# Patient Record
Sex: FEMALE | Race: WHITE | Employment: FULL TIME | URBAN - METROPOLITAN AREA
[De-identification: names, ages, dates, MRNs, and addresses within clinical notes are randomized per-mention and may not be internally consistent; named-entity substitution may affect disease eponyms.]

---

## 2024-08-12 ENCOUNTER — HOSPITAL ENCOUNTER (OUTPATIENT)
Dept: PHYSICAL THERAPY | Age: 29
Setting detail: RECURRING SERIES
Discharge: HOME | End: 2024-08-15
Payer: COMMERCIAL

## 2024-08-12 DIAGNOSIS — R27.8 OTHER LACK OF COORDINATION: ICD-10-CM

## 2024-08-12 DIAGNOSIS — N39.41 URGE INCONTINENCE: ICD-10-CM

## 2024-08-12 DIAGNOSIS — M62.838 OTHER MUSCLE SPASM: Primary | ICD-10-CM

## 2024-08-12 DIAGNOSIS — N39.3 STRESS INCONTINENCE (FEMALE) (MALE): ICD-10-CM

## 2024-08-12 PROCEDURE — 97163 PT EVAL HIGH COMPLEX 45 MIN: CPT

## 2024-08-12 PROCEDURE — 97530 THERAPEUTIC ACTIVITIES: CPT

## 2024-08-12 ASSESSMENT — PAIN SCALES - GENERAL: PAINLEVEL_OUTOF10: 6

## 2024-08-12 NOTE — PROGRESS NOTES
Ursula Gadnara  : 1995  Primary: [employer]na (Commercial)  Secondary: HUMANA MEDICAID [facility] @ [address]  [place] [address]  Phone: [phone]  Fax: [phone] Plan Frequency: 1x/week  Plan of Care/Certification Expiration Date: 24        Plan of Care/Certification Expiration Date:  Plan of Care/Certification Expiration Date: 24    Frequency/Duration: Plan Frequency: 1x/week      Time In/Out:   Time In: 0105  Time Out: 0208      PT Visit Info:    Progress Note Due Date: 24      Visit Count:  1    OUTPATIENT PHYSICAL THERAPY:   Treatment Note 2024       Episode  (PFPT)               Treatment Diagnosis:    Other muscle spasm  Other lack of coordination  Urge incontinence  Stress incontinence (female) (male)  Medical/Referring Diagnosis:    Pelvic and perineal pain    Referring Physician:  Tanvi Bustamante APRN - NP MD Orders:  PT Eval and Treat   Return MD Appt:     Date of Onset:  No data recorded   Allergies:   Patient has no allergy information on record.  Restrictions/Precautions:   Trauma induced seizures      Interventions Planned (Treatment may consist of any combination of the following):     See Assessment Note    Subjective Comments:   See IE dated 24  Initial Pain Level::     6/10  Post Session Pain Level:       7/10  Medications Last Reviewed:  2024  Updated Objective Findings:  See Evaluation Note from today  Treatment   THERAPEUTIC ACTIVITY: ( 25 minutes): Functional activity education regarding anatomy, pathology and role of pelvic floor muscle (PFM) function in relation to presenting symptoms and role of pelvic floor therapy in conservative treatment. and Instruction on coordinated pelvic floor and diaphragmatic breathing to improve kinesthetic awareness of pelvic muscle mobility and restore proper motor recruitment patterns with breathing, posture, and functional movement (e.g. appropriate

## 2024-08-12 NOTE — THERAPY EVALUATION
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)  Secondary: HUMANA MEDICAID [facility] @ [address]  [facility] [address]  Phone: [phone]  Fax: [phone] Plan Frequency: 1x/week    Plan of Care/Certification Expiration Date: 24        Plan of Care/Certification Expiration Date:  Plan of Care/Certification Expiration Date: 24    Frequency/Duration: Plan Frequency: 1x/week      Time In/Out:   Time In: 0105  Time Out: 0208    PT Visit Info:    Plan Frequency: 1x/week  Progress Note Due Date: 24      Visit Count:  1                OUTPATIENT PHYSICAL THERAPY:             Initial Assessment 2024               Episode (PFPT)         Treatment Diagnosis:     Other muscle spasm  Other lack of coordination  Urge incontinence  Stress incontinence (female) (male)  Contributing Diagnosis:  Pelvic floor dysfunction in female (M62.98)  Medical/Referring Diagnosis:    Pelvic and perineal pain      Referring Physician:  Tanvi Bustamante APRN - NP  MD Orders:  PT Eval and Treat   Return MD Appt:  2024  Date of Onset:    Chronic  Allergies:  Patient has no allergy information on record.  Restrictions/Precautions:    History of non-epileptic seizures       Medications Last Reviewed:  2024     SUBJECTIVE   History of Injury/Illness (Reason for Referral):  Ms. Gandara is a 27 yo female referred to pelvic floor physical therapy (PFPT) by Tanvi Bustamante, AP* 2/2 Pelvic and perineal pain [R10.2] and urinary incontinence.     Urinary: Pt describes urinary issues since she was a child. She describes urinary incontinence since she was a child. Urinary leakage occurs with a strong urge to urination. She has had urodynamic testing previously (2018 or 2019) without evident findings. She was offered Botox, but she declined. States she lived in [place] when this was completed.     Pelvic pain:   She describes pain when her bladder 
,DirectAddress_Unknown

## 2024-08-27 ENCOUNTER — HOSPITAL ENCOUNTER (OUTPATIENT)
Dept: PHYSICAL THERAPY | Age: 29
Setting detail: RECURRING SERIES
Discharge: HOME | End: 2024-08-30
Payer: COMMERCIAL

## 2024-08-27 PROCEDURE — 97110 THERAPEUTIC EXERCISES: CPT

## 2024-08-27 PROCEDURE — 97140 MANUAL THERAPY 1/> REGIONS: CPT

## 2024-08-27 ASSESSMENT — PAIN SCALES - GENERAL: PAINLEVEL_OUTOF10: 4

## 2024-09-03 ENCOUNTER — APPOINTMENT (OUTPATIENT)
Dept: PHYSICAL THERAPY | Age: 29
End: 2024-09-03
Payer: COMMERCIAL

## 2024-09-05 ENCOUNTER — HOSPITAL ENCOUNTER (OUTPATIENT)
Dept: PHYSICAL THERAPY | Age: 29
Setting detail: RECURRING SERIES
Discharge: HOME | End: 2024-09-08
Payer: COMMERCIAL

## 2024-09-05 PROCEDURE — 97140 MANUAL THERAPY 1/> REGIONS: CPT

## 2024-09-05 PROCEDURE — 97110 THERAPEUTIC EXERCISES: CPT

## 2024-09-05 ASSESSMENT — PAIN SCALES - GENERAL: PAINLEVEL_OUTOF10: 3

## 2024-09-10 ENCOUNTER — HOSPITAL ENCOUNTER (OUTPATIENT)
Dept: PHYSICAL THERAPY | Age: 29
Setting detail: RECURRING SERIES
Discharge: HOME | End: 2024-09-13
Payer: COMMERCIAL

## 2024-09-10 PROCEDURE — 97110 THERAPEUTIC EXERCISES: CPT

## 2024-09-10 PROCEDURE — 97140 MANUAL THERAPY 1/> REGIONS: CPT

## 2024-09-10 ASSESSMENT — PAIN SCALES - GENERAL: PAINLEVEL_OUTOF10: 2

## 2024-09-17 ENCOUNTER — APPOINTMENT (OUTPATIENT)
Dept: PHYSICAL THERAPY | Age: 29
End: 2024-09-17
Payer: COMMERCIAL

## 2024-09-24 ENCOUNTER — HOSPITAL ENCOUNTER (OUTPATIENT)
Dept: PHYSICAL THERAPY | Age: 29
Setting detail: RECURRING SERIES
Discharge: HOME | End: 2024-09-27
Payer: COMMERCIAL

## 2024-09-24 PROCEDURE — 97140 MANUAL THERAPY 1/> REGIONS: CPT

## 2024-09-24 PROCEDURE — 97110 THERAPEUTIC EXERCISES: CPT

## 2024-09-24 ASSESSMENT — PAIN SCALES - GENERAL: PAINLEVEL_OUTOF10: 5

## 2024-10-01 ENCOUNTER — APPOINTMENT (OUTPATIENT)
Dept: PHYSICAL THERAPY | Age: 29
End: 2024-10-01
Payer: COMMERCIAL

## 2024-10-08 ENCOUNTER — HOSPITAL ENCOUNTER (OUTPATIENT)
Dept: PHYSICAL THERAPY | Age: 29
Setting detail: RECURRING SERIES
Discharge: HOME | End: 2024-10-11
Payer: COMMERCIAL

## 2024-10-08 PROCEDURE — 97140 MANUAL THERAPY 1/> REGIONS: CPT

## 2024-10-08 PROCEDURE — 97110 THERAPEUTIC EXERCISES: CPT

## 2024-10-08 ASSESSMENT — PAIN SCALES - GENERAL: PAINLEVEL_OUTOF10: 3

## 2024-10-08 NOTE — PROGRESS NOTES
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)  Secondary: HUMANA MEDICAID [facility] @ [address]  [facility] [address]  Phone: [phone]  Fax: [phone] Plan Frequency: 1x/week  Plan of Care/Certification Expiration Date: 24        Plan of Care/Certification Expiration Date:  Plan of Care/Certification Expiration Date: 24    Frequency/Duration: Plan Frequency: 1x/week      Time In/Out:   Time In: 0105  Time Out: 0203      PT Visit Info:    Progress Note Due Date: 24      Visit Count:  6    OUTPATIENT PHYSICAL THERAPY:   Treatment Note 10/8/2024       Episode  (PFPT)               Treatment Diagnosis:     Other muscle spasm  Other lack of coordination  Urge incontinence  Stress incontinence (female) (male)  Contributing Diagnosis:  Pelvic floor dysfunction in female (M62.98)  Medical/Referring Diagnosis:    Pelvic and perineal pain      Referring Physician:  Tanvi Bustamante APRN - NP MD Orders:  PT Eval and Treat   Return MD Appt:     Date of Onset:  No data recorded   Allergies:   Patient has no allergy information on record.  Restrictions/Precautions:   Trauma induced seizures      Interventions Planned (Treatment may consist of any combination of the following):     See Assessment Note    Subjective Comments:   10/8/24:  Current pelvic pain: 3/10. Pt ambulating with cane.   Pt is leaking less.   1 PPD.   Pt not experiencing pelvic floor spasms.   She is working on drinking water.   Pt experienced pain following her previous session. She notices pain following the exercises and then her pain lessens.     24:  Pt ambulating with RW. States her vertigo and balance have not been good. She is also experiencing ankle pain.   Current pelvic pain: 5/10  Continues to use 1 PPD.   Pt describes cramping and intermittent sharp pain following previous therapy session. She felt relief after this subsided.   Frequency of PFM spasms

## 2024-10-15 ENCOUNTER — HOSPITAL ENCOUNTER (OUTPATIENT)
Dept: PHYSICAL THERAPY | Age: 29
Setting detail: RECURRING SERIES
Discharge: HOME | End: 2024-10-18
Payer: COMMERCIAL

## 2024-10-15 ENCOUNTER — APPOINTMENT (OUTPATIENT)
Dept: PHYSICAL THERAPY | Age: 29
End: 2024-10-15
Payer: COMMERCIAL

## 2024-10-15 PROCEDURE — 97110 THERAPEUTIC EXERCISES: CPT

## 2024-10-15 PROCEDURE — 97140 MANUAL THERAPY 1/> REGIONS: CPT

## 2024-10-15 ASSESSMENT — PAIN SCALES - GENERAL: PAINLEVEL_OUTOF10: 3

## 2024-10-15 NOTE — PROGRESS NOTES
8, seated   Supine PFM stretch In butterfly position x 5 breaths     HEP review/update Reviewed - use of kegal and exhalation with transitional movements including bed mobility     Sit to stand with PFM activation 3 x 5, coordination of exhale and PFM contraction   3 x 5, coordination of exhale and PFM contraction    Bridge 3 x 5 with hip abduction, YTB 3 x 5 with hip abduction, BTB 2 x 8 reps   Side-lying hip ER 2 x 10, YTB 2 x 10, YTB 2 x 10, YTB   Side-stepping  4 x 6 steps (YTB) above knees    Plyoff press  2 x 10 BTB             Access Code: [email or web address]  URL: [email or web address]  Date: 09/05/2024  Prepared by: Johanne Spencer    Exercises  - Hooklying Clamshell with Resistance  - 1 x daily - 7 x weekly - 2 sets - 10 reps  - Pelvic floor contraction + hip adduction  - 1 x daily - 7 x weekly - 2 sets - 10 reps  - Supported Butterfly Stretch with Pelvic Floor Relaxation  - 2 x daily - 7 x weekly - 10 reps  - Supine Diaphragmatic breathing with Pelvic Floor Contraction COORDINATION  - 2 x daily - 7 x weekly - 10 sets    THERAPEUTIC ACTIVITY: ( minutes)    TA Educational Topic Notes Date Completed   Pathology/Anatomy/PFM Function Completed  Reviewed 8/12/24 9/5/24   Bladder health education Reviewed 8/12/24   Urinary urge suppression     The knack     Voiding strategies     Nocturia tips     Bowel/Bladder log     Bowel health education     Constipation care     Diarrhea/Fecal leakage     Colonic massage     Toilet positioning     Defecation dynamics     Sources of fiber     Return to intercourse/vaginal trainers/wand     Perineal massage     Sexual positioning     Lubricants/vaginal moisturizers     Vulvovaginal health/vaginal irritants     Body mechanics     Posture/ergonomics     Diaphragmatic breathing Provided as HEP, reviewed rationale 8/12/24   Pain science education Initiated discussion 8/12/24   Resources and technology     Other patient education        MANUAL THERAPY: (25 minutes):   Soft tissue

## 2024-10-22 ENCOUNTER — APPOINTMENT (OUTPATIENT)
Dept: PHYSICAL THERAPY | Age: 29
End: 2024-10-22
Payer: COMMERCIAL

## 2024-10-29 ENCOUNTER — HOSPITAL ENCOUNTER (OUTPATIENT)
Dept: PHYSICAL THERAPY | Age: 29
Setting detail: RECURRING SERIES
Discharge: HOME | End: 2024-11-01
Payer: COMMERCIAL

## 2024-10-29 PROCEDURE — 97110 THERAPEUTIC EXERCISES: CPT

## 2024-10-29 PROCEDURE — 97140 MANUAL THERAPY 1/> REGIONS: CPT

## 2024-10-29 ASSESSMENT — PAIN SCALES - GENERAL: PAINLEVEL_OUTOF10: 4

## 2024-10-29 NOTE — PROGRESS NOTES
HEP review/update      Sit to stand with PFM activation 3 x 5, coordination of exhale and PFM contraction  4 x 5, coordination of exhale and PFM contraction   Bridge 3 x 5 with hip abduction, BTB 2 x 8 reps 2 x 10   Side-lying hip ER 2 x 10, YTB 2 x 10, YTB 2 x 10, BTB   Side-stepping 4 x 6 steps (YTB) above knees     Plyoff press 2 x 10 BTB              Access Code: [email or web address]  URL: [email or web address]  Date: 09/05/2024  Prepared by: Johanne Spencer    Exercises  - Hooklying Clamshell with Resistance  - 1 x daily - 7 x weekly - 2 sets - 10 reps  - Pelvic floor contraction + hip adduction  - 1 x daily - 7 x weekly - 2 sets - 10 reps  - Supported Butterfly Stretch with Pelvic Floor Relaxation  - 2 x daily - 7 x weekly - 10 reps  - Supine Diaphragmatic breathing with Pelvic Floor Contraction COORDINATION  - 2 x daily - 7 x weekly - 10 sets    THERAPEUTIC ACTIVITY: (5 minutes)    TA Educational Topic Notes Date Completed   Pathology/Anatomy/PFM Function Completed  Reviewed 8/12/24 9/5/24   Bladder health education Reviewed  Avoidance of bladder irritants including chocolate  Management of bladder spasms 8/12/24  10/29/24     Urinary urge suppression     The knack     Voiding strategies     Nocturia tips     Bowel/Bladder log     Bowel health education     Constipation care     Diarrhea/Fecal leakage     Colonic massage     Toilet positioning     Defecation dynamics     Sources of fiber     Return to intercourse/vaginal trainers/wand     Perineal massage     Sexual positioning     Lubricants/vaginal moisturizers     Vulvovaginal health/vaginal irritants     Body mechanics     Posture/ergonomics     Diaphragmatic breathing Provided as HEP, reviewed rationale 8/12/24   Pain science education Initiated discussion 8/12/24   Resources and technology     Other patient education        MANUAL THERAPY: (35 minutes):   Soft tissue mobilization was utilized and necessary because of the patient's painful spasm and

## 2024-11-05 ENCOUNTER — HOSPITAL ENCOUNTER (OUTPATIENT)
Dept: PHYSICAL THERAPY | Age: 29
Setting detail: RECURRING SERIES
Discharge: HOME | End: 2024-11-08
Payer: COMMERCIAL

## 2024-11-05 PROCEDURE — 97140 MANUAL THERAPY 1/> REGIONS: CPT | Performed by: PHYSICAL THERAPIST

## 2024-11-05 PROCEDURE — 97110 THERAPEUTIC EXERCISES: CPT | Performed by: PHYSICAL THERAPIST

## 2024-11-05 ASSESSMENT — PAIN SCALES - GENERAL: PAINLEVEL_OUTOF10: 4

## 2024-11-05 NOTE — THERAPY RECERTIFICATION
History: Number of pregnancies: 0 Number of vaginal births: 0 Number of caesarean births : 0.    *Global joint pain       Patient Stated Goal(s):  \"Build strength to help with incontinence and less pain with sex\"    Initial Pain Level:     4/10   Post Session Pain Level:     4/10    Past Medical History/Comorbidities:   Ms. Gandara  has no past medical history on file.  Ms. Gandara  has no past surgical history on file.  *Seizures - non-epileptic. States she had a seizure 2 weeks ago. She does not respond well to stress.   *She can experience screaming seizures - trauma based   *TBI - 2018    Social History/Living Environment:   Patient lives with their spouse      Level of Function/Work/Activity:    Pt on social security and completing disability application        Learning:   Does the patient/guardian have any barriers to learning?: Financial; Emotional      Fall Risk Scale:   Lou Total Score: 55         OBJECTIVE   External Observations:  Voluntary contraction: Present  Voluntary relaxation: Present  Involuntary contraction: Present  Involuntary relaxation: Present  Perineal descent at rest: Absent   Perineal descent with bearing: Present  Postural assessment: Forward Head Posture, Increased Thoracic Kyphosis, and Rounded Shoulders  Gait: Impaired - using straight cane     Pelvic Floor Muscle (PFM) Assessment:  Vaginal vault size: [] decreased     [] increased     [x] WNL  Muscle volume: [] decreased     [] WNL     [x] Defect  PFM tension: [] decreased     [x] increased     [] WNL     Contraction ability:  Voluntary contraction: [] absent     [] weak     [x] moderate      [] strong  Voluntary relaxation: [] absent     [] complete   [] incomplete   [x] delayed   MMT: 4/5      Palpation: via vaginal canal  Superficial Pelvic Floor Musculature (PFM): Tender? Intermediate PFM Tender? Deep PFM Tender?   Superficial Transverse Perineal [x] Right  [x] Left  []DNT  Mild, B   Deep Transverse Perineal [] Right  [] Left

## 2024-11-05 NOTE — PROGRESS NOTES
4 x 5, coordination of exhale and PFM contraction X 10 coordination of exhale and PFM contraction   Bridge 3 x 5 with hip abduction, BTB 2 x 8 reps 2 x 10 2 x 10   Side-lying hip ER 2 x 10, YTB 2 x 10, YTB 2 x 10, BTB    Side-stepping 4 x 6 steps (YTB) above knees      Plyoff press 2 x 10 BTB       TrA activation with march   2 x 5 reps each side      Access Code: [email or web address]  URL: [email or web address]  Date: 09/05/2024  Prepared by: Johanne Spencer    Exercises  - Hooklying Clamshell with Resistance  - 1 x daily - 7 x weekly - 2 sets - 10 reps  - Pelvic floor contraction + hip adduction  - 1 x daily - 7 x weekly - 2 sets - 10 reps  - Supported Butterfly Stretch with Pelvic Floor Relaxation  - 2 x daily - 7 x weekly - 10 reps  - Supine Diaphragmatic breathing with Pelvic Floor Contraction COORDINATION  - 2 x daily - 7 x weekly - 10 sets    THERAPEUTIC ACTIVITY: ( minutes)    TA Educational Topic Notes Date Completed   Pathology/Anatomy/PFM Function Completed  Reviewed 8/12/24 9/5/24   Bladder health education Reviewed  Avoidance of bladder irritants including chocolate  Management of bladder spasms 8/12/24  10/29/24     Urinary urge suppression     The knack     Voiding strategies     Nocturia tips     Bowel/Bladder log     Bowel health education     Constipation care     Diarrhea/Fecal leakage     Colonic massage     Toilet positioning     Defecation dynamics     Sources of fiber     Return to intercourse/vaginal trainers/wand     Perineal massage     Sexual positioning     Lubricants/vaginal moisturizers     Vulvovaginal health/vaginal irritants     Body mechanics     Posture/ergonomics     Diaphragmatic breathing Provided as HEP, reviewed rationale 8/12/24   Pain science education Initiated discussion 8/12/24   Resources and technology     Other patient education        MANUAL THERAPY: (32 minutes):   Soft tissue mobilization was utilized and necessary because of the patient's painful spasm and restricted

## 2024-11-12 ENCOUNTER — HOSPITAL ENCOUNTER (OUTPATIENT)
Dept: PHYSICAL THERAPY | Age: 29
Setting detail: RECURRING SERIES
Discharge: HOME | End: 2024-11-15
Payer: COMMERCIAL

## 2024-11-12 PROCEDURE — 97140 MANUAL THERAPY 1/> REGIONS: CPT | Performed by: PHYSICAL THERAPIST

## 2024-11-12 PROCEDURE — 97530 THERAPEUTIC ACTIVITIES: CPT | Performed by: PHYSICAL THERAPIST

## 2024-11-12 PROCEDURE — 97110 THERAPEUTIC EXERCISES: CPT | Performed by: PHYSICAL THERAPIST

## 2024-11-12 NOTE — PROGRESS NOTES
promote proper body mechanics and promote proper body breathing techniques.  Progressed range and repetitions as indicated.   Date:  10/15/24 Date:  10/29/24 Date:  11/5/24 Date:  11/12/24   Activity/Exercise Parameters Parameters Parameters Parameters   Quadruped pelvic tilts       Supine DB with LE\"s supported on chair       PFM drops - contract/relax 4 x 5 with digital cues to facilitate full PFM relaxation   4 x 5 with digital cues to facilitate full PFM relaxation  2 x 10  digital cues to facilitate full PFM relaxation    Supine hip ER/abduction  X 10 BTB 2 x 10 YTB    Supine hip adduction/ball squeeze + TrA activation 3 x 8, seated  2 x 10 supine    Supine PFM stretch       HEP review/update       Sit to stand with PFM activation  4 x 5, coordination of exhale and PFM contraction X 10 coordination of exhale and PFM contraction 2 X 10  coordination of exhale and PFM contraction   Bridge 2 x 8 reps 2 x 10 2 x 10    Side-lying hip ER 2 x 10, YTB 2 x 10, BTB     Side-stepping    3 x 5   Plyoff press       TrA activation with march  2 x 5 reps each side  Completed in standing  2 x 5   Lateral step-up    Bosu 3 x 5     Access Code: [email or web address]  URL: [email or web address]  Date: 09/05/2024  Prepared by: Johanne Spencer    Exercises  - Hooklying Clamshell with Resistance  - 1 x daily - 7 x weekly - 2 sets - 10 reps  - Pelvic floor contraction + hip adduction  - 1 x daily - 7 x weekly - 2 sets - 10 reps  - Supported Butterfly Stretch with Pelvic Floor Relaxation  - 2 x daily - 7 x weekly - 10 reps  - Supine Diaphragmatic breathing with Pelvic Floor Contraction COORDINATION  - 2 x daily - 7 x weekly - 10 sets    THERAPEUTIC ACTIVITY: (10 minutes)    TA Educational Topic Notes Date Completed   Pathology/Anatomy/PFM Function Completed  Reviewed 8/12/24 9/5/24   Bladder health education Reviewed  Avoidance of bladder irritants including chocolate  Management of bladder spasms  Reviewed - avoidance of irritants,

## 2024-11-19 ENCOUNTER — APPOINTMENT (OUTPATIENT)
Dept: PHYSICAL THERAPY | Age: 29
End: 2024-11-19
Payer: COMMERCIAL

## 2024-11-26 ENCOUNTER — APPOINTMENT (OUTPATIENT)
Dept: PHYSICAL THERAPY | Age: 29
End: 2024-11-26
Payer: COMMERCIAL

## 2024-12-03 ENCOUNTER — HOSPITAL ENCOUNTER (OUTPATIENT)
Dept: PHYSICAL THERAPY | Age: 29
Setting detail: RECURRING SERIES
Discharge: HOME | End: 2024-12-06
Payer: COMMERCIAL

## 2024-12-03 PROCEDURE — 97110 THERAPEUTIC EXERCISES: CPT | Performed by: PHYSICAL THERAPIST

## 2024-12-03 PROCEDURE — 97140 MANUAL THERAPY 1/> REGIONS: CPT | Performed by: PHYSICAL THERAPIST

## 2024-12-03 ASSESSMENT — PAIN SCALES - GENERAL: PAINLEVEL_OUTOF10: 1
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
## 2024-12-10 ENCOUNTER — HOSPITAL ENCOUNTER (OUTPATIENT)
Dept: PHYSICAL THERAPY | Age: 29
Setting detail: RECURRING SERIES
Discharge: HOME | End: 2024-12-13
Payer: COMMERCIAL

## 2024-12-10 PROCEDURE — 97140 MANUAL THERAPY 1/> REGIONS: CPT | Performed by: PHYSICAL THERAPIST

## 2024-12-10 PROCEDURE — 97110 THERAPEUTIC EXERCISES: CPT | Performed by: PHYSICAL THERAPIST

## 2024-12-10 ASSESSMENT — PAIN SCALES - GENERAL: PAINLEVEL_OUTOF10: 1

## 2024-12-10 NOTE — PROGRESS NOTES
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)  Secondary: HUMANA MEDICAID [facility] @ [address]  [facility] [address]  Phone: [phone]  Fax: [phone] Plan Frequency: 1x/week  Plan of Care/Certification Expiration Date: 24        Plan of Care/Certification Expiration Date:  Plan of Care/Certification Expiration Date: 24    Frequency/Duration: Plan Frequency: 1x/week      Time In/Out:   Time In: 0104  Time Out: 0200      PT Visit Info:    Progress Note Due Date: 24      Visit Count:  12    OUTPATIENT PHYSICAL THERAPY:   Treatment Note 12/10/2024       Episode  (PFPT)               Treatment Diagnosis:     Other muscle spasm  Other lack of coordination  Urge incontinence  Stress incontinence (female) (male)  Contributing Diagnosis:  Pelvic floor dysfunction in female (M62.98)  Medical/Referring Diagnosis:    Pelvic and perineal pain      Referring Physician:  Tanvi Bustamante APRN - NP MD Orders:  PT Eval and Treat   Return MD Appt:     Date of Onset:  No data recorded   Allergies:   Patient has no allergy information on record.  Restrictions/Precautions:   Trauma induced seizures      Interventions Planned (Treatment may consist of any combination of the following):     See Assessment Note    Subjective Comments:   12/10/24:  Pt just completed her menses.   States overall pelvic pain has been manageable.   Bladder pain also less.   Urinary incontinence: Improved. Only using pads when in public and gone for an extended period of time.  Pt started taking a Nebulizer treatment.     12/3/24:  Pt reports feeling very sore following her previus session. It took 1 week to recover.   Spasms were constant in the last week.   Currently, she is experiencing aching discomfort.   Pt saw a new Rheumatologist.  Pt feels relief with internal work.     24:  Pt attempted sexual intercourse. \"I had no feeling during intercourse. Sometimes

## 2024-12-17 ENCOUNTER — HOSPITAL ENCOUNTER (OUTPATIENT)
Dept: PHYSICAL THERAPY | Age: 29
Setting detail: RECURRING SERIES
Discharge: HOME | End: 2024-12-20
Payer: COMMERCIAL

## 2024-12-17 PROCEDURE — 97140 MANUAL THERAPY 1/> REGIONS: CPT | Performed by: PHYSICAL THERAPIST

## 2024-12-17 NOTE — PROGRESS NOTES
needling)    Pt gives verbal consent to internal vaginal assessment/treatment without chaperon present.     Treatment/Session Summary:    Treatment Assessment:   Pt continues to show improved PFM control and reduced tenderness to palpation of her PFM.  Communication/Consultation:   Equipment provided today:  HEP  Recommendations/Intent for next treatment session: Next visit will focus on PFM coordination for muscle relaxation as well as hip and core strengthening, continue intra-vaginal MT    >Total Treatment Billable Duration:  Treatment minutes 27  Time In: 0122  Time Out: 0149    Johanne Spencer PT         Charge Capture  Events  ServiceRelated Portal  Appt Desk  Attendance Report     Future Appointments   Date Time Provider Department Center   1/7/2025  1:00 PM Johanne Spencer PT [facility]   1/14/2025  2:00 PM Johanne Spencer PT [facility]   1/21/2025  1:00 PM Johanne Spencer PT [facility]

## 2024-12-23 ENCOUNTER — APPOINTMENT (OUTPATIENT)
Dept: PHYSICAL THERAPY | Age: 29
End: 2024-12-23
Payer: COMMERCIAL

## 2025-01-07 ENCOUNTER — HOSPITAL ENCOUNTER (OUTPATIENT)
Dept: PHYSICAL THERAPY | Age: 30
Setting detail: RECURRING SERIES
End: 2025-01-07
Payer: COMMERCIAL

## 2025-01-14 ENCOUNTER — HOSPITAL ENCOUNTER (OUTPATIENT)
Dept: PHYSICAL THERAPY | Age: 30
Setting detail: RECURRING SERIES
Discharge: HOME | End: 2025-01-17
Payer: COMMERCIAL

## 2025-01-14 PROCEDURE — 97140 MANUAL THERAPY 1/> REGIONS: CPT | Performed by: PHYSICAL THERAPIST

## 2025-01-14 PROCEDURE — 97110 THERAPEUTIC EXERCISES: CPT | Performed by: PHYSICAL THERAPIST

## 2025-01-14 ASSESSMENT — PAIN SCALES - GENERAL: PAINLEVEL_OUTOF10: 6

## 2025-01-14 NOTE — THERAPY RECERTIFICATION
Contraction ability:  Voluntary contraction: [] absent     [] weak     [x] moderate      [] strong  Voluntary relaxation: [] absent     [x] complete   [] incomplete   [] delayed   MMT: 4/5 x 10 contractions  Endurance: >5 seconds     Palpation: via vaginal canal  Superficial Pelvic Floor Musculature (PFM): Tender? Intermediate PFM Tender? Deep PFM Tender?   Superficial Transverse Perineal [x] Right  [x] Left  []DNT  Mild, B   Deep Transverse Perineal [] Right  [] Left  []DNT   Puborectalis [] Right  [] Left  []DNT   Ischiocavernosus [] Right  [] Left  []DNT   Compressor Urethra [] Right  [] Left  []DNT Pubococcygeus [] Right  [] Left  []DNT   Bulbocavernosus [] Right  [] Left  []DNT       Iliococcygeus [x] Right  [x] Left  []DNT  Aching, B  Moderate           Obturator Internus [x] Right Moderate [x] Aching, B[]DNT           Coccygeus [] Right  [] Left  [x]DNT          External palpation:  Muscle/muscle group Tender?   Adductors [x] Right  [x] Left  []DNT   Abdominal wall [] Right  [] Left  []DNT   Pubic symphysis [] Right  [] Left  []DNT   Iliopsoas [] Right  [] Left  []DNT   Gluteals [] Right  [] Left  []DNT   Piriformis [] Right  [] Left  []DNT      Breath assessment:  Observation: DB well coordinated  Coordination of pelvic floor muscles with breath:  moderate pelvic floor muscle excursion  Co-contraction of PFM with transversus abdominis: present  Voluntary lengthening present.     ASSESSMENT   REASSESSMENT: Ms. Gandara has been seen in skilled PT from 8/12/24 to 1/14/25. Patient has attended 14 out of 16 scheduled visits. Treatment has emphasized PFM coordination, PFM mobility, bladder re-training, core and hip strengthening, as well as pain management strategies. Patient has made improvements in reduction in pelvic pain, improved urinary control, reduced pain with gynecological exam, reduced pain with tampon use, however continues to demonstrate deficits in PFM tension and tenderness, core weakness, and hip

## 2025-01-14 NOTE — PROGRESS NOTES
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)  Secondary: [employer] MEDICAID [facility] @ [address]  [facility] [address]  Phone: [phone]  Fax: [phone] Plan Frequency: 1x/week  Plan of Care/Certification Expiration Date: 25        Plan of Care/Certification Expiration Date:  Plan of Care/Certification Expiration Date: 25    Frequency/Duration: Plan Frequency: 1x/week      Time In/Out:   Time In: 0210  Time Out: 0307      PT Visit Info:    Progress Note Due Date: 24      Visit Count:  14    OUTPATIENT PHYSICAL THERAPY:   Treatment Note 2025       Episode  (PFPT)               Treatment Diagnosis:     Other muscle spasm  Other lack of coordination  Urge incontinence  Stress incontinence (female) (male)  Contributing Diagnosis:  Pelvic floor dysfunction in female (M62.98)  Medical/Referring Diagnosis:    Pelvic and perineal pain      Referring Physician:  Tanvi Bustamante APRN - NP MD Orders:  PT Eval and Treat   Return MD Appt:     Date of Onset:  No data recorded   Allergies:   Patient has no allergy information on record.  Restrictions/Precautions:   Trauma induced seizures      Interventions Planned (Treatment may consist of any combination of the following):     See Assessment Note    Subjective Comments:   25:  Patient has been struggling with kidney stones the last 2 weeks. She was in a lot of pain and unable to differentiate between PF and kidney stones as the cause.   States she was able to pass and catch stone last night. She will f/u with her primary care doctor tomorrow. She has been in the ER 2x.   Medications have also caused more UI.   *See Re-Certification dated 1/14/25    12/10/24:  Pt just completed her menses.   States overall pelvic pain has been manageable.   Bladder pain also less.   Urinary incontinence: Improved. Only using pads when in public and gone for an extended period of time.  Pt started

## 2025-01-21 ENCOUNTER — HOSPITAL ENCOUNTER (OUTPATIENT)
Dept: PHYSICAL THERAPY | Age: 30
Setting detail: RECURRING SERIES
Discharge: HOME | End: 2025-01-24
Payer: COMMERCIAL

## 2025-01-21 PROCEDURE — 97110 THERAPEUTIC EXERCISES: CPT | Performed by: PHYSICAL THERAPIST

## 2025-01-21 PROCEDURE — 97140 MANUAL THERAPY 1/> REGIONS: CPT | Performed by: PHYSICAL THERAPIST

## 2025-01-21 NOTE — PROGRESS NOTES
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)  Secondary: [employer] MEDICAID [facility] @ [address]  [facility] [address]  Phone: [phone]  Fax: [phone] Plan Frequency: 1x/week  Plan of Care/Certification Expiration Date: 25        Plan of Care/Certification Expiration Date:  Plan of Care/Certification Expiration Date: 25    Frequency/Duration: Plan Frequency: 1x/week      Time In/Out:   Time In: 0106  Time Out: 0203      PT Visit Info:        Visit Count:  15    OUTPATIENT PHYSICAL THERAPY:   Treatment Note 2025       Episode  (PFPT)               Treatment Diagnosis:     Other muscle spasm  Other lack of coordination  Urge incontinence  Stress incontinence (female) (male)  Contributing Diagnosis:  Pelvic floor dysfunction in female (M62.98)  Medical/Referring Diagnosis:    Pelvic and perineal pain      Referring Physician:  Tanvi Bustamante APRN - NP MD Orders:  PT Eval and Treat   Return MD Appt:     Date of Onset:  No data recorded   Allergies:   Patient has no allergy information on record.  Restrictions/Precautions:   Trauma induced seizures      Interventions Planned (Treatment may consist of any combination of the following):     See Assessment Note    Subjective Comments:   25:  Pt with improved urinary control since passing kidney stones.  Notes her bladder remains very sensitive.   Pt working on consistency with completion of her HEP.  Purchased vaginal dilator today.  Ambulating without cane.    25:  Patient has been struggling with kidney stones the last 2 weeks. She was in a lot of pain and unable to differentiate between PF and kidney stones as the cause.   States she was able to pass and catch stone last night. She will f/u with her primary care doctor tomorrow. She has been in the ER 2x.   Medications have also caused more UI.   *See Re-Certification dated 1/14/25    12/10/24:  Pt just completed her

## 2025-01-29 ENCOUNTER — APPOINTMENT (OUTPATIENT)
Dept: PHYSICAL THERAPY | Age: 30
End: 2025-01-29
Payer: COMMERCIAL

## 2025-02-05 ENCOUNTER — APPOINTMENT (OUTPATIENT)
Dept: PHYSICAL THERAPY | Age: 30
End: 2025-02-05
Payer: MEDICARE

## 2025-02-11 ENCOUNTER — HOSPITAL ENCOUNTER (OUTPATIENT)
Dept: PHYSICAL THERAPY | Age: 30
Setting detail: RECURRING SERIES
Discharge: HOME | End: 2025-02-14
Payer: MEDICARE

## 2025-02-11 PROCEDURE — 97110 THERAPEUTIC EXERCISES: CPT | Performed by: PHYSICAL THERAPIST

## 2025-02-11 PROCEDURE — 97530 THERAPEUTIC ACTIVITIES: CPT | Performed by: PHYSICAL THERAPIST

## 2025-02-11 ASSESSMENT — PAIN SCALES - GENERAL: PAINLEVEL_OUTOF10: 4

## 2025-02-11 NOTE — PROGRESS NOTES
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)  Secondary: AETNA [facility] @ [address]  [facility] [address]  Phone: [phone]  Fax: [phone] Plan Frequency: 1x/week  Plan of Care/Certification Expiration Date: 25        Plan of Care/Certification Expiration Date:  Plan of Care/Certification Expiration Date: 25    Frequency/Duration: Plan Frequency: 1x/week      Time In/Out:   Time In: 221  Time Out: 0304      PT Visit Info:        Visit Count:  16    OUTPATIENT PHYSICAL THERAPY:   Treatment Note 2025       Episode  (PFPT)               Treatment Diagnosis:     Other muscle spasm  Other lack of coordination  Urge incontinence  Stress incontinence (female) (male)  Contributing Diagnosis:  Pelvic floor dysfunction in female (M62.98)  Medical/Referring Diagnosis:    Pelvic and perineal pain      Referring Physician:  Tanvi Bustamante APRN - NP MD Orders:  PT Eval and Treat   Return MD Appt:     Date of Onset:  No data recorded   Allergies:   Patient has no allergy information on record.  Restrictions/Precautions:   Trauma induced seizures      Interventions Planned (Treatment may consist of any combination of the following):     See Assessment Note    Subjective Comments:   25:  Overall pain has been significantly less.   Since her kidney stone, she notes worsening in incontinence. She is wearing pull-ups at night. She is experiencing bed wetting.   She is restricting fluids after 10PM and going to sleep at Midnight. Notes caffeine exacerbates symptoms  Pt is taking diuretic in the morning.   She wakes up with strong urge to void and feels its too painful to get to bathroom in time to void.   Bladder pain increases with spicy foods.     25:  Pt with improved urinary control since passing kidney stones.  Notes her bladder remains very sensitive.   Pt working on consistency with completion of her

## 2025-02-18 ENCOUNTER — APPOINTMENT (OUTPATIENT)
Dept: PHYSICAL THERAPY | Age: 30
End: 2025-02-18
Payer: MEDICARE

## 2025-02-25 ENCOUNTER — APPOINTMENT (OUTPATIENT)
Dept: PHYSICAL THERAPY | Age: 30
End: 2025-02-25
Payer: MEDICARE

## 2025-03-28 ENCOUNTER — CLINICAL DOCUMENTATION (OUTPATIENT)
Dept: PHYSICAL THERAPY | Age: 30
End: 2025-03-28

## 2025-03-28 NOTE — THERAPY DISCHARGE
[facility] @ [address]  [facility] [address]  Phone: [phone]  Fax: [phone]    OUTPATIENT PHYSICAL THERAPY  Discharge Summary 3/28/2025  Episode  Appt Desk         Ms. Gandara's therapy has come to an end at this time due to: Expiration of plan of care without further referral by ordering physician    Physical Therapy Goals:  Unknown    Johanne Spencer, PT